# Patient Record
Sex: MALE | Race: ASIAN | Employment: STUDENT | URBAN - METROPOLITAN AREA
[De-identification: names, ages, dates, MRNs, and addresses within clinical notes are randomized per-mention and may not be internally consistent; named-entity substitution may affect disease eponyms.]

---

## 2019-05-04 ENCOUNTER — OFFICE VISIT (OUTPATIENT)
Dept: URGENT CARE | Facility: URGENT CARE | Age: 19
End: 2019-05-04
Payer: COMMERCIAL

## 2019-05-04 ENCOUNTER — ANCILLARY PROCEDURE (OUTPATIENT)
Dept: GENERAL RADIOLOGY | Facility: CLINIC | Age: 19
End: 2019-05-04
Attending: FAMILY MEDICINE
Payer: COMMERCIAL

## 2019-05-04 VITALS
HEART RATE: 74 BPM | WEIGHT: 132.4 LBS | DIASTOLIC BLOOD PRESSURE: 62 MMHG | SYSTOLIC BLOOD PRESSURE: 98 MMHG | OXYGEN SATURATION: 98 % | TEMPERATURE: 97.9 F | HEIGHT: 73 IN | BODY MASS INDEX: 17.55 KG/M2

## 2019-05-04 DIAGNOSIS — R07.0 THROAT PAIN: ICD-10-CM

## 2019-05-04 DIAGNOSIS — R05.9 COUGH: Primary | ICD-10-CM

## 2019-05-04 DIAGNOSIS — R50.9 FEVER AND CHILLS: ICD-10-CM

## 2019-05-04 DIAGNOSIS — R10.13 ABDOMINAL PAIN, EPIGASTRIC: ICD-10-CM

## 2019-05-04 DIAGNOSIS — M54.50 ACUTE BILATERAL LOW BACK PAIN WITHOUT SCIATICA: ICD-10-CM

## 2019-05-04 LAB
ALBUMIN UR-MCNC: 30 MG/DL
APPEARANCE UR: CLEAR
BACTERIA #/AREA URNS HPF: ABNORMAL /HPF
BASOPHILS # BLD AUTO: 0 10E9/L (ref 0–0.2)
BASOPHILS NFR BLD AUTO: 0 %
BILIRUB UR QL STRIP: ABNORMAL
COLOR UR AUTO: YELLOW
DIFFERENTIAL METHOD BLD: NORMAL
EOSINOPHIL # BLD AUTO: 0 10E9/L (ref 0–0.7)
EOSINOPHIL NFR BLD AUTO: 0 %
ERYTHROCYTE [DISTWIDTH] IN BLOOD BY AUTOMATED COUNT: 12.6 % (ref 10–15)
GLUCOSE UR STRIP-MCNC: NEGATIVE MG/DL
HCT VFR BLD AUTO: 42.3 % (ref 40–53)
HGB BLD-MCNC: 14.5 G/DL (ref 13.3–17.7)
HGB UR QL STRIP: NEGATIVE
KETONES UR STRIP-MCNC: ABNORMAL MG/DL
LEUKOCYTE ESTERASE UR QL STRIP: NEGATIVE
LYMPHOCYTES # BLD AUTO: 1.7 10E9/L (ref 0.8–5.3)
LYMPHOCYTES NFR BLD AUTO: 20.6 %
MCH RBC QN AUTO: 31.9 PG (ref 26.5–33)
MCHC RBC AUTO-ENTMCNC: 34.3 G/DL (ref 31.5–36.5)
MCV RBC AUTO: 93 FL (ref 78–100)
MONOCYTES # BLD AUTO: 1.1 10E9/L (ref 0–1.3)
MONOCYTES NFR BLD AUTO: 13.5 %
MUCOUS THREADS #/AREA URNS LPF: PRESENT /LPF
NEUTROPHILS # BLD AUTO: 5.3 10E9/L (ref 1.6–8.3)
NEUTROPHILS NFR BLD AUTO: 65.9 %
NITRATE UR QL: NEGATIVE
NON-SQ EPI CELLS #/AREA URNS LPF: ABNORMAL /LPF
PH UR STRIP: 6 PH (ref 5–7)
PLATELET # BLD AUTO: 186 10E9/L (ref 150–450)
RBC # BLD AUTO: 4.54 10E12/L (ref 4.4–5.9)
RBC #/AREA URNS AUTO: ABNORMAL /HPF
SOURCE: ABNORMAL
SP GR UR STRIP: 1.02 (ref 1–1.03)
UROBILINOGEN UR STRIP-ACNC: 0.2 EU/DL (ref 0.2–1)
WBC # BLD AUTO: 8 10E9/L (ref 4–11)
WBC #/AREA URNS AUTO: ABNORMAL /HPF

## 2019-05-04 PROCEDURE — 85025 COMPLETE CBC W/AUTO DIFF WBC: CPT | Performed by: FAMILY MEDICINE

## 2019-05-04 PROCEDURE — 71046 X-RAY EXAM CHEST 2 VIEWS: CPT

## 2019-05-04 PROCEDURE — 99214 OFFICE O/P EST MOD 30 MIN: CPT | Performed by: FAMILY MEDICINE

## 2019-05-04 PROCEDURE — 81001 URINALYSIS AUTO W/SCOPE: CPT | Performed by: FAMILY MEDICINE

## 2019-05-04 PROCEDURE — 36415 COLL VENOUS BLD VENIPUNCTURE: CPT | Performed by: FAMILY MEDICINE

## 2019-05-04 RX ORDER — METHOCARBAMOL 750 MG/1
750 TABLET, FILM COATED ORAL 4 TIMES DAILY PRN
Qty: 28 TABLET | Refills: 0 | Status: SHIPPED | OUTPATIENT
Start: 2019-05-04 | End: 2019-05-11

## 2019-05-04 ASSESSMENT — MIFFLIN-ST. JEOR: SCORE: 1669.44

## 2019-06-03 NOTE — PROGRESS NOTES
"SUBJECTIVE: Justice Hunter is a 18 year old male presenting with a chief complaint of cough  and abd pain and back pain.  Onset of symptoms was day(s) ago.  Course of illness is same.    Severity moderate  Current and Associated symptoms: stuffy nose and cough - non-productive  Treatment measures tried include Tylenol/Ibuprofen.  Predisposing factors include None.    Past Medical History:   Diagnosis Date     NO ACTIVE PROBLEMS      No Known Allergies  Social History     Tobacco Use     Smoking status: Never Smoker     Smokeless tobacco: Never Used   Substance Use Topics     Alcohol use: Not on file       ROS:  SKIN: no rash  GI: no vomiting    OBJECTIVE:  BP 98/62   Pulse 74   Temp 97.9  F (36.6  C) (Oral)   Ht 1.854 m (6' 1\")   Wt 60.1 kg (132 lb 6.4 oz)   SpO2 98%   BMI 17.47 kg/m  GENERAL APPEARANCE: healthy, alert and no distress  EYES: EOMI,  PERRL, conjunctiva clear  HENT: ear canals and TM's normal.  Nose and mouth without ulcers, erythema or lesions  NECK: supple, nontender, no lymphadenopathy  RESP: lungs clear to auscultation - no rales, rhonchi or wheezes  CV: regular rates and rhythm, normal S1 S2, no murmur noted  ABDOMEN:  soft, nontender, no HSM or masses and bowel sounds normal  NEURO: Normal strength and tone, sensory exam grossly normal,  normal speech and mentation  SKIN: no suspicious lesions or rashes      ICD-10-CM    1. Cough R05 CBC with platelets differential     XR Chest 2 Views   2. Abdominal pain, epigastric R10.13 CBC with platelets differential     UA with Microscopic reflex to Culture     XR Chest 2 Views   3. Acute bilateral low back pain without sciatica M54.5 UA with Microscopic reflex to Culture     methocarbamol (ROBAXIN) 750 MG tablet   4. Throat pain R07.0 CBC with platelets differential   5. Fever and chills R50.9 CBC with platelets differential     UA with Microscopic reflex to Culture     XR Chest 2 Views       Fluids/Rest, f/u if worse/not any better    "

## 2019-12-07 ENCOUNTER — HOSPITAL ENCOUNTER (EMERGENCY)
Facility: CLINIC | Age: 19
Discharge: HOME | End: 2019-12-07
Attending: EMERGENCY MEDICINE | Admitting: EMERGENCY MEDICINE
Payer: COMMERCIAL

## 2019-12-07 VITALS
RESPIRATION RATE: 12 BRPM | DIASTOLIC BLOOD PRESSURE: 74 MMHG | BODY MASS INDEX: 18.62 KG/M2 | OXYGEN SATURATION: 96 % | TEMPERATURE: 97.4 F | WEIGHT: 141.1 LBS | HEART RATE: 77 BPM | SYSTOLIC BLOOD PRESSURE: 110 MMHG

## 2019-12-07 DIAGNOSIS — T14.8XXA ABRASION: ICD-10-CM

## 2019-12-07 DIAGNOSIS — G89.29 CHRONIC BACK PAIN, UNSPECIFIED BACK LOCATION, UNSPECIFIED BACK PAIN LATERALITY: ICD-10-CM

## 2019-12-07 DIAGNOSIS — M54.9 CHRONIC BACK PAIN, UNSPECIFIED BACK LOCATION, UNSPECIFIED BACK PAIN LATERALITY: ICD-10-CM

## 2019-12-07 PROCEDURE — 99284 EMERGENCY DEPT VISIT MOD MDM: CPT | Mod: Z6 | Performed by: EMERGENCY MEDICINE

## 2019-12-07 PROCEDURE — 99282 EMERGENCY DEPT VISIT SF MDM: CPT | Performed by: EMERGENCY MEDICINE

## 2019-12-07 ASSESSMENT — ENCOUNTER SYMPTOMS
BACK PAIN: 1
WOUND: 1

## 2019-12-07 NOTE — DISCHARGE INSTRUCTIONS
Please follow-up with primary care or student health for further evaluation of your chronic back pain.  Return the emergency department as needed for any new or worsening symptoms.

## 2019-12-07 NOTE — ED AVS SNAPSHOT
[facility], [place], Emergency Department  [address]  [facility] [address]  Phone:  [phone]  Fax:  [phone]                                    Justice Hunter   MRN: [id number]    Department:  [facility], Emergency Department   Date of Visit:  12/7/2019           After Visit Summary Signature Page    I have received my discharge instructions, and my questions have been answered. I have discussed any challenges I see with this plan with the nurse or doctor.    ..........................................................................................................................................  Patient/Patient Representative Signature      ..........................................................................................................................................  Patient Representative Print Name and Relationship to Patient    ..................................................               ................................................  Date                                   Time    ..........................................................................................................................................  Reviewed by Signature/Title    ...................................................              ..............................................  Date                                               Time          22EPIC Rev 08/18

## 2019-12-07 NOTE — ED TRIAGE NOTES
Pt walking slipped on ice while holding glass bottle resulting in superficial cut to right palm;  Chronic back pain may be agitated by fall today.

## 2019-12-07 NOTE — ED PROVIDER NOTES
[facility] EMERGENCY DEPARTMENT ([facility])     December 7, 2019    History     Chief Complaint   Patient presents with     Laceration     right palm      Back Pain     1.5 years; aggrevated by today's fall     HPI  Justice Hunter is a 19 year old male who presents to the ED for evaluation of a right hand injury. Patient reports he slipped on the ice while holding a glass bottle last night. He states the glass cut the bottom left area of his right palm. He reports he cleaned it with hand , but it continued to bleed this morning.     Patient also complains of ongoing back pain along his entire spine. He states it feels like he needs to crack his back, but it won't always crack. He denies worsening back pain after his fall yesterday. Patient reports the pain is constant, mild, and uncomfortable. He denies any extreme physical activity. He notes he was prescribed some medications for his back pain last year, but he doesn't take it because he doesn't like the way it makes him feel.     Per MIGIANNA, patient's last tetanus shot was on 2/21/12.    PAST MEDICAL HISTORY  Past Medical History:   Diagnosis Date     NO ACTIVE PROBLEMS      PAST SURGICAL HISTORY  History reviewed. No pertinent surgical history.  FAMILY HISTORY  Family History   Problem Relation Age of Onset     Diabetes Maternal Grandmother      Diabetes Maternal Grandfather      SOCIAL HISTORY  Social History     Tobacco Use     Smoking status: Never Smoker     Smokeless tobacco: Never Used   Substance Use Topics     Alcohol use: Not on file     MEDICATIONS  No current facility-administered medications for this encounter.      No current outpatient medications on file.     ALLERGIES  No Known Allergies      I have reviewed the Medications, Allergies, Past Medical and Surgical History, and Social History in the Epic system.    Review of Systems   Musculoskeletal: Positive for back pain (ongoing).   Skin: Positive for wound (R hand - bottom  left area of palm).   All other systems reviewed and are negative.      Physical Exam   BP: 110/74  Pulse: 77  Temp: 97.4  F (36.3  C)  Resp: 12  Weight: 64 kg (141 lb 1.6 oz)  SpO2: 96 %      Physical Exam  Vitals signs and nursing note reviewed.   Constitutional:       General: He is not in acute distress.     Appearance: He is well-developed.   HENT:      Head: Normocephalic.   Eyes:      General: No scleral icterus.  Neck:      Musculoskeletal: Neck supple.   Pulmonary:      Effort: No respiratory distress.   Musculoskeletal:         General: No deformity.      Comments: Mild diffuse mid thoracic and lower back tenderness.  No specific spot of more severe point tenderness.  No apparent abrasions or contusions.   Skin:     General: Skin is dry.      Comments: Left hand superficial laceration palmar proximal ulnar aspect 1 cm.   Neurological:      Mental Status: He is alert.         ED Course        Procedures           No results found for this or any previous visit (from the past 24 hour(s)).         Assessments & Plan (with Medical Decision Making)   19-year-old male presents to us with a chief complaint of fall and hand laceration and chronic back pain.  Tetanus is up-to-date.  Laceration is no longer bleeding.  It occurred last evening.  We will clean it and place bacitracin but at this point it does not warrant suturing.  In terms of patient's back pain he reports he has history of chronic thoracic and lumbar back pain.  He states today it is unchanged from its normal baseline despite last night's fall.  Recommend he follow-up with student health or primary care for possible referral to physical therapy.  Also recommend he take over-the-counter Tylenol or ibuprofen initially for medication as he has not tried this before.  Patient notes no any other injuries or new pain since the fall.  He did not hit his head and denies headache.  Patient is comfortable discharge home and the plan.    I have reviewed the  nursing notes.    I have reviewed the findings, diagnosis, plan and need for follow up with the patient.    There are no discharge medications for this patient.      Final diagnoses:   Abrasion   Chronic back pain, unspecified back location, unspecified back pain laterality     I, Celi Frye, am serving as a trained medical scribe to document services personally performed by  El Nolasco DO, based on the provider's statements to me.      IEl DO, was physically present and have reviewed and verified the accuracy of this note documented by Celi Frye.     12/7/2019   [facility], [place], EMERGENCY DEPARTMENT     El Nolasco DO  12/07/19 4291

## 2019-12-27 ENCOUNTER — APPOINTMENT (OUTPATIENT)
Dept: RADIOLOGY | Age: 19
End: 2019-12-27
Attending: NURSE PRACTITIONER
Payer: COMMERCIAL

## 2019-12-27 ENCOUNTER — HOSPITAL ENCOUNTER (OUTPATIENT)
Facility: CLINIC | Age: 19
Discharge: HOME | End: 2019-12-27
Attending: FAMILY MEDICINE
Payer: COMMERCIAL

## 2019-12-27 VITALS
OXYGEN SATURATION: 98 % | WEIGHT: 141.2 LBS | TEMPERATURE: 97.8 F | HEART RATE: 72 BPM | RESPIRATION RATE: 12 BRPM | SYSTOLIC BLOOD PRESSURE: 100 MMHG | DIASTOLIC BLOOD PRESSURE: 60 MMHG

## 2019-12-27 DIAGNOSIS — J20.9 ACUTE BRONCHITIS, UNSPECIFIED ORGANISM: Primary | ICD-10-CM

## 2019-12-27 PROCEDURE — S9088 SERVICES PROVIDED IN URGENT: HCPCS | Performed by: NURSE PRACTITIONER

## 2019-12-27 PROCEDURE — 71046 X-RAY EXAM CHEST 2 VIEWS: CPT | Mod: 26 | Performed by: NURSE PRACTITIONER

## 2019-12-27 PROCEDURE — 99202 OFFICE O/P NEW SF 15 MIN: CPT | Performed by: NURSE PRACTITIONER

## 2019-12-27 ASSESSMENT — ENCOUNTER SYMPTOMS
SHORTNESS OF BREATH: 0
WHEEZING: 1
PALPITATIONS: 0
BACK PAIN: 0
COUGH: 1
DYSURIA: 0
ABDOMINAL PAIN: 0
ARTHRALGIAS: 0
SEIZURES: 0
CHILLS: 0
VOMITING: 0
FEVER: 0
SORE THROAT: 0
EYE PAIN: 0
FATIGUE: 1
HEMATURIA: 0
COLOR CHANGE: 0

## 2019-12-27 NOTE — DISCHARGE INSTRUCTIONS
Take OTC Mucinex DM 2 daily for cough. Use 2 sprays Flonase daily x 3 weeks. Rinse nares with saline nasal spray 5 minutes before Flonase use. Use albuterol inhaler 3x per day for 3 days, two puffs as instructed and then as needed every 6-8 hours thereafter. Rest and drink plenty of fluids. Stop taking Dayquil/Nyquil.  If have difficulty breathing, go to ER.    Thank you for choosing our Urgent Care.

## 2019-12-28 NOTE — ED ATTESTATION NOTE
Reviewed  and supervised the care ,I agreed with the assessment and plan      Nevin Carlos MD  12/28/19 8201

## 2020-02-11 ENCOUNTER — TRANSFERRED RECORDS (OUTPATIENT)
Dept: HEALTH INFORMATION MANAGEMENT | Facility: CLINIC | Age: 20
End: 2020-02-11

## 2020-02-11 ENCOUNTER — MEDICAL CORRESPONDENCE (OUTPATIENT)
Dept: HEALTH INFORMATION MANAGEMENT | Facility: CLINIC | Age: 20
End: 2020-02-11

## 2020-02-13 ENCOUNTER — TELEPHONE (OUTPATIENT)
Dept: OPHTHALMOLOGY | Facility: CLINIC | Age: 20
End: 2020-02-13

## 2020-02-14 ENCOUNTER — TELEPHONE (OUTPATIENT)
Dept: OPHTHALMOLOGY | Facility: CLINIC | Age: 20
End: 2020-02-14

## 2020-02-21 ENCOUNTER — OFFICE VISIT (OUTPATIENT)
Dept: OPHTHALMOLOGY | Facility: CLINIC | Age: 20
End: 2020-02-21
Payer: COMMERCIAL

## 2020-02-21 DIAGNOSIS — H00.15 CHALAZION LEFT LOWER EYELID: Primary | ICD-10-CM

## 2020-02-21 RX ORDER — ERYTHROMYCIN 5 MG/G
OINTMENT OPHTHALMIC ONCE
Status: COMPLETED | OUTPATIENT
Start: 2020-02-21 | End: 2020-02-21

## 2020-02-21 RX ORDER — LIDOCAINE HYDROCHLORIDE AND EPINEPHRINE 10; 10 MG/ML; UG/ML
1 INJECTION, SOLUTION INFILTRATION; PERINEURAL ONCE
Status: COMPLETED | OUTPATIENT
Start: 2020-02-21 | End: 2020-02-21

## 2020-02-21 RX ADMIN — LIDOCAINE HYDROCHLORIDE AND EPINEPHRINE 1 ML: 10; 10 INJECTION, SOLUTION INFILTRATION; PERINEURAL at 10:46

## 2020-02-21 RX ADMIN — ERYTHROMYCIN: 5 OINTMENT OPHTHALMIC at 10:46

## 2020-02-21 ASSESSMENT — TONOMETRY
OS_IOP_MMHG: 12
IOP_METHOD: ICARE
OD_IOP_MMHG: 12

## 2020-02-21 ASSESSMENT — VISUAL ACUITY
METHOD: SNELLEN - LINEAR
OS_CC: 20/20
OD_CC: 20/20
CORRECTION_TYPE: GLASSES

## 2020-02-21 ASSESSMENT — EXTERNAL EXAM - LEFT EYE: OS_EXAM: NORMAL

## 2020-02-21 ASSESSMENT — SLIT LAMP EXAM - LIDS: COMMENTS: NORMAL

## 2020-02-21 ASSESSMENT — CONF VISUAL FIELD
METHOD: COUNTING FINGERS
OS_NORMAL: 1
OD_NORMAL: 1

## 2020-02-21 ASSESSMENT — EXTERNAL EXAM - RIGHT EYE: OD_EXAM: NORMAL

## 2020-02-21 NOTE — PATIENT INSTRUCTIONS
"Instructions for your chalazion / chalazia:  Most chalazia will resolve with treatment at home using warm compresses and massage to soften and drain them.  Follow these steps twice a day:     1.  Soak the eyelids for ten minutes with a hot wet cloth -- as hot as you can stand but not so hot that you burn yourself.  An easy way to make a long-lasting warm compress is to wrap a boiled egg or potato in a wet washcloth.  If you use the microwave to heat anything, be VERY CAREFUL that it is not too hot as microwaved foods and cloths can have very uneven hot spots that pose a burn hazard.       2.  After the eyelids are soft and refreshed from the hot compress, clean the debris from the glands at the bases of the eyelashes.  With a warm wet washcloth wrapped around your index finger, use the tip of your finger to vigorously scrub the bases of the eyelashes.  The principle is similar to brushing your teeth but here you can use a side-to-side motion.  Perform ten strokes per eyelid across the entire length of the eyelid. You can use plain water for this brushing but many patients claim better results if they use a dilute solution of one capful of Chapito's Baby Shampoo in a glass of water.  This cleaning dislodges and removes the caked-in secretions in the gland and debris on the eyelids.  Do NOT wash the EYEBALL.     3.  If you have been prescribed an ointment, rub it on the eyelashes now.  Do NOT use Visine, Clear Eyes, or any \"anti-redness\" eye drops.  These can worsen your eye redness and irritation over time.     4.  Remember:  chalazia may take many WEEKS TO MONTHS to go away...so, hang in there and keep up with the compresses and scrubs!     5.  Diet & Supplements:  Modifying your diet helps reduce the chance of developing chalazia and possibly acne in some individuals.  This includes:  Avoid or decrease your intake of coffee, chocolate, refined sugars, and fried or processed foods. (Reduce gluten, breads, pastas, " and processed foods.)  Increase consumption of vegetables and fruits, fresh or lightly cooked. There is evidence  That Omega 3 supplementation will help as well. These are available at the local pharmacy or health food store. Dietary supplements with omega-3 fatty acids thin and decrease the inflammatory potential of the eyelid duct secretions decreasing your chance for recurrent chalazia in the future.  Omega-3 supplements are available from flax seeds, flax seed oil, or purified fish oil.  Supplement 500 - 1,500 mg of fish and/or flax seed oil daily for pre-adolescent children and 1,000 - 2,000 mg daily for adolescents and adults.  If you have any bleeding or cardiovascular problems or take prescription blood thinners, consult with your primary care physician before starting omega-3 supplements.      6.  Finally, if the chalazia persist despite following all the measures above consistently for at least 2 months, we can consider surgical removal.  In adults, this is performed as a 15 minute office procedure under local anesthesia. This necessitates general anesthesia in children, which we would much prefer to avoid if possible; so, again, please be diligent and patient with the above regimen.     7. If you have any questions please do not hesitate to contact us at [phone].

## 2020-02-21 NOTE — PROGRESS NOTES
"Chief Complaints and History of Present Illnesses   Patient presents with     Consult For     Chalazion eval     Chief Complaint(s) and History of Present Illness(es)     Consult For     In left eye.  This started 3 months ago.  Occurring constantly.  Since   onset it is stable.  Associated symptoms include Negative for eye pain.    Treatments tried include no treatments.  Pain was noted as 0/10.   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 Perry for   chalazion marvel LLL. Bump started 3 months ago. Doing warm compresses some   of the time, but has not helped. No discharge or pain. No vision changes.   Not taking eye drops    Doris Harper COT 9:54 AM February 21, 2020     -Bump on lateral left lower lid x2mo. Started out as a stye. Tried warm packs but didn't drain. Also tried homeopathic medicine and \"stye drops\" which didn't help  -No pain, bump is just \"annoying\"       Assessment & Plan     Justice Hunter is a 19 year old male with the following diagnoses:   1. Chalazion left lower eyelid       -Discussed risks, benefits, alternatives of chalazion I&D left lower lid and patient elects to proceed  -Please see separate procedure note  -Start erythro ointment twice a day left eye x1wk  -Return precautions discussed. F/u as needed         Complete documentation of historical and exam elements from today's encounter can be found in the full encounter summary report (not reduplicated in this progress note). I personally obtained the chief complaint(s) and history of present illness.  I confirmed and edited as necessary the review of systems, past medical/surgical history, family history, social history, and examination findings as documented by others; and I examined the patient myself. I personally reviewed the relevant tests, images, and reports as documented above. I formulated and edited as necessary the assessment and plan and discussed " the findings and management plan with the patient and family. I was present for all procedures performed during this visit.    Tawana Prabhakar MD  Oculoplastic Surgery Fellow

## (undated) RX ORDER — LIDOCAINE HYDROCHLORIDE AND EPINEPHRINE 10; 10 MG/ML; UG/ML
INJECTION, SOLUTION INFILTRATION; PERINEURAL
Status: DISPENSED
Start: 2020-02-21

## (undated) RX ORDER — ERYTHROMYCIN 5 MG/G
OINTMENT OPHTHALMIC
Status: DISPENSED
Start: 2020-02-21